# Patient Record
Sex: FEMALE | URBAN - METROPOLITAN AREA
[De-identification: names, ages, dates, MRNs, and addresses within clinical notes are randomized per-mention and may not be internally consistent; named-entity substitution may affect disease eponyms.]

---

## 2023-11-17 ENCOUNTER — APPOINTMENT (RX ONLY)
Dept: URBAN - METROPOLITAN AREA CLINIC 339 | Facility: CLINIC | Age: 68
Setting detail: DERMATOLOGY
End: 2023-11-17

## 2023-11-17 DIAGNOSIS — L98.8 OTHER SPECIFIED DISORDERS OF THE SKIN AND SUBCUTANEOUS TISSUE: ICD-10-CM

## 2023-11-17 PROCEDURE — ? COUNSELING

## 2023-11-17 PROCEDURE — ? BOTOX (U OR CC)

## 2023-11-17 NOTE — PROCEDURE: BOTOX (U OR CC)
Document As Units Or Cc?: units
Dilution (U/0.1 Cc): 0.5
Nasal Root Units/Cc: 0
Consent: Written consent obtained. Risks include but not limited to lid/brow ptosis, bruising, swelling, diplopia, temporary effect, incomplete chemical denervation.
Expiration Date (Month Year): 2025/11
Forehead Units/Cc: 10
Lot #: [id number]
Including Pricing Information In The Note: No
Glabellar Complex Units: 14
Price (Use Numbers Only, No Special Characters Or $): 408
Post-Care Instructions: Patient instructed to not lie down for 4 hours and limit physical activity for 24 hours. Patient instructed not to travel by airplane for 48 hours.
Detail Level: Detailed

## 2023-12-08 ENCOUNTER — APPOINTMENT (RX ONLY)
Dept: URBAN - METROPOLITAN AREA CLINIC 339 | Facility: CLINIC | Age: 68
Setting detail: DERMATOLOGY
End: 2023-12-08

## 2023-12-08 DIAGNOSIS — L98.8 OTHER SPECIFIED DISORDERS OF THE SKIN AND SUBCUTANEOUS TISSUE: ICD-10-CM

## 2023-12-08 PROCEDURE — ? COUNSELING

## 2023-12-08 PROCEDURE — ? BOTOX

## 2023-12-08 NOTE — PROCEDURE: BOTOX
Lateral Platysmal Bands Units: 0
Show Masseter Units: Yes
Detail Level: Detailed
Lot #: [id number]
Show Lcl Units: No
Dilution (U/0.1 Cc): 0.5
Periorbital Skin Units: 8
Expiration Date (Month Year): 11/2025
Price (Use Numbers Only, No Special Characters Or $): 96.00
Incrementing Botox Units: By 0.5 Units
Consent: Written consent obtained. Risks include but not limited to lid/brow ptosis, bruising, swelling, diplopia, temporary effect, incomplete chemical denervation.
Post-Care Instructions: Patient instructed to not lie down for 4 hours and limit physical activity for 24 hours. Patient instructed not to travel by airplane for 48 hours.

## 2024-03-20 ENCOUNTER — APPOINTMENT (RX ONLY)
Dept: URBAN - METROPOLITAN AREA CLINIC 356 | Facility: CLINIC | Age: 69
Setting detail: DERMATOLOGY
End: 2024-03-20

## 2024-03-20 DIAGNOSIS — Z41.9 ENCOUNTER FOR PROCEDURE FOR PURPOSES OTHER THAN REMEDYING HEALTH STATE, UNSPECIFIED: ICD-10-CM

## 2024-03-20 PROCEDURE — ? JUVEDERM VOLLURE XC INJECTION

## 2024-03-20 PROCEDURE — ? JUVEDERM VOLUMA XC INJECTION

## 2024-03-20 PROCEDURE — ? DYSPORT

## 2024-03-20 NOTE — PROCEDURE: JUVEDERM VOLUMA XC INJECTION
Marionette Lines Filler Volume In Cc: 0
Lot #: [id number]
Consent: Written consent obtained. Risks include but not limited to bruising, beading, irregular texture, ulceration, infection, allergic reaction, scar formation, incomplete augmentation, temporary nature, procedural pain.
Post-Care Instructions: Patient instructed to apply ice to reduce swelling.
Detail Level: Detailed
Include Cannula Information In Note?: No
Anesthesia Volume In Cc: 0.5
Cheeks Filler Volume In Cc: 2
Map Statment: See Attach Map for Complete Details
Expiration Date (Month Year): 09/26/24
Filler: Juvederm Voluma XC
Price (Use Numbers Only, No Special Characters Or $): 1600
Anesthesia Type: 1% lidocaine with epinephrine
Additional Anesthesia Volume In Cc: 6
Procedural Text: The filler was administered to the treatment areas noted above.

## 2024-03-20 NOTE — PROCEDURE: DYSPORT
Additional Comments: $14/ 3 units
Inferior Lateral Orbicularis Oculi Units: 60
Show Orbicularis Oculi Units: Yes
Additional Area 5 Units: 0
Consent: Written consent obtained. Risks include but not limited to lid/brow ptosis, bruising, swelling, diplopia, temporary effect, incomplete chemical denervation.
Post-Care Instructions: Patient instructed to not lie down for 4 hours and limit physical activity for 24 hours.
Show Right And Left Periorbital Units: No
Dilution (U/0.1 Cc): 10
Detail Level: Detailed
Forehead Units: 30
Price (Use Numbers Only, No Special Characters Or $): 289
Glabellar Complex Units: 45

## 2024-03-20 NOTE — HPI: COSMETIC (BOTOX)
[id number]: previous_has_had_botox
When Was Your Last Botox Treatment?: 12/1/23
Additional History: Consult on Botox and possible filler.

## 2024-03-20 NOTE — PROCEDURE: JUVEDERM VOLLURE XC INJECTION
Jawline Filler Volume In Cc: 0
Expiration Date (Month Year): 11/16/24
Including Pricing Information In The Note: No
Price (Use Numbers Only, No Special Characters Or $): 316
Filler: Juvederm Vollure XC
Nasolabial Folds Filler Volume In Cc: 1
Consent: Written consent obtained. Risks include but not limited to bruising, beading, irregular texture, ulceration, infection, allergic reaction, scar formation, incomplete augmentation, temporary nature, procedural pain.
Anesthesia Type: 1% lidocaine with epinephrine
Additional Anesthesia Volume In Cc: 6
Procedural Text: The filler was administered to the treatment areas noted above.
Lot #: [id number]
Post-Care Instructions: Patient instructed to apply ice to reduce swelling.
Detail Level: Detailed
Anesthesia Volume In Cc: 0.5
Map Statment: See Attach Map for Complete Details

## 2024-04-02 ENCOUNTER — APPOINTMENT (RX ONLY)
Dept: URBAN - METROPOLITAN AREA CLINIC 356 | Facility: CLINIC | Age: 69
Setting detail: DERMATOLOGY
End: 2024-04-02

## 2024-04-02 DIAGNOSIS — Z41.9 ENCOUNTER FOR PROCEDURE FOR PURPOSES OTHER THAN REMEDYING HEALTH STATE, UNSPECIFIED: ICD-10-CM

## 2024-04-02 PROCEDURE — ? COSMETIC FOLLOW-UP

## 2024-04-02 NOTE — HPI: COSMETIC FOLLOW UP
How Did You Tolerate The Procedure?: well, without problems
What Condition Are We Treating?: Botox and filler
What Procedure Did We Perform At The Last Visit?: Filler and Botox

## 2024-04-02 NOTE — PROCEDURE: COSMETIC FOLLOW-UP
Price (Use Numbers Only, No Special Characters Or $): 0
Comments (Free Text): Patient very satisfied with filler and Botox results.  She may consider doing some rha redensity around the lips, and some Kysse in the lips next visit.  I also recommend Sculptra for her.
Detail Level: Zone

## 2024-08-20 ENCOUNTER — APPOINTMENT (RX ONLY)
Dept: URBAN - METROPOLITAN AREA CLINIC 356 | Facility: CLINIC | Age: 69
Setting detail: DERMATOLOGY
End: 2024-08-20

## 2024-08-20 DIAGNOSIS — Z41.9 ENCOUNTER FOR PROCEDURE FOR PURPOSES OTHER THAN REMEDYING HEALTH STATE, UNSPECIFIED: ICD-10-CM

## 2024-08-20 PROCEDURE — ? DYSPORT

## 2024-08-20 NOTE — PROCEDURE: DYSPORT
Inferior Lateral Orbicularis Oculi Units: 60
Show Orbicularis Oculi Units: Yes
Additional Area 5 Units: 0
Consent: Written consent obtained. Risks include but not limited to lid/brow ptosis, bruising, swelling, diplopia, temporary effect, incomplete chemical denervation.
Post-Care Instructions: Patient instructed to not lie down for 4 hours and limit physical activity for 24 hours.
Show Right And Left Periorbital Units: No
Lot #: [id number]
Dilution (U/0.1 Cc): 10
Detail Level: Detailed
Expiration Date (Month Year): 01/2026
Forehead Units: 30
Price (Use Numbers Only, No Special Characters Or $): 056
Glabellar Complex Units: 45

## 2025-02-12 ENCOUNTER — APPOINTMENT (OUTPATIENT)
Dept: URBAN - METROPOLITAN AREA CLINIC 356 | Facility: CLINIC | Age: 70
Setting detail: DERMATOLOGY
End: 2025-02-12

## 2025-02-12 DIAGNOSIS — Z41.9 ENCOUNTER FOR PROCEDURE FOR PURPOSES OTHER THAN REMEDYING HEALTH STATE, UNSPECIFIED: ICD-10-CM

## 2025-02-12 PROCEDURE — ? DYSPORT

## 2025-02-12 NOTE — HPI: COSMETIC (DYSPORT)
[id number]: previous_has_had_dysport
When Was Your Last Dysport Treatment?: 08/24
Additional History: Dysport
Statement Selected

## 2025-02-12 NOTE — PROCEDURE: DYSPORT
Show Right And Left Brow Units: No
Mentalis Units: 0
Show Anterior Platysmal Band Units: Yes
Price (Use Numbers Only, No Special Characters Or $): 356
Expiration Date (Month Year): 5/31/26
Additional Area 1 Location: jelly rolls
Forehead Units: 30
Consent: Written consent obtained. Risks include but not limited to lid/brow ptosis, bruising, swelling, diplopia, temporary effect, incomplete chemical denervation.
Post-Care Instructions: Patient instructed to not lie down for 4 hours and limit physical activity for 24 hours.
Inferior Lateral Orbicularis Oculi Units: 60
Additional Area 1 Units: 6
Glabellar Complex Units: 45
Lot #: [id number]
Detail Level: Detailed
Dilution (U/0.1 Cc): 10

## 2025-02-19 ENCOUNTER — APPOINTMENT (OUTPATIENT)
Dept: URBAN - METROPOLITAN AREA CLINIC 356 | Facility: CLINIC | Age: 70
Setting detail: DERMATOLOGY
End: 2025-02-19

## 2025-02-19 DIAGNOSIS — Z41.9 ENCOUNTER FOR PROCEDURE FOR PURPOSES OTHER THAN REMEDYING HEALTH STATE, UNSPECIFIED: ICD-10-CM

## 2025-02-19 PROCEDURE — ? JUVEDERM VOLUMA XC INJECTION

## 2025-02-19 PROCEDURE — ? COSMETIC FOLLOW-UP

## 2025-02-19 PROCEDURE — ? JUVEDERM VOLLURE XC INJECTION

## 2025-02-19 NOTE — PROCEDURE: COSMETIC FOLLOW-UP
Detail Level: Zone
Comments (Free Text): Touch upon 4 sample units Botox in the forehead.
Price (Use Numbers Only, No Special Characters Or $): 0

## 2025-02-19 NOTE — PROCEDURE: JUVEDERM VOLLURE XC INJECTION
Procedural Text: The filler was administered to the treatment areas noted above.
Number Of Syringes (Required For Inventory): 1
Marionette Lines Filler Volume In Cc: 0
Consent: Written consent obtained. Risks include but not limited to bruising, beading, irregular texture, ulceration, infection, allergic reaction, scar formation, incomplete augmentation, temporary nature, procedural pain.
Additional Anesthesia Volume In Cc: 6
Use Map Statement For Sites (Optional): No
Detail Level: Detailed
Lot #: [id number]
Anesthesia Volume In Cc: 0.5
Post-Care Instructions: Patient instructed to apply ice to reduce swelling.
Expiration Date (Month Year): 2/28/25
Filler: Juvederm Vollure XC
Map Statment: See Attach Map for Complete Details
Anesthesia Type: 1% lidocaine with epinephrine
Price (Use Numbers Only, No Special Characters Or $): 085

## 2025-02-19 NOTE — PROCEDURE: JUVEDERM VOLUMA XC INJECTION
Include Cannula Information In Note?: No
Decollete Filler Volume In Cc: 0
Number Of Syringes (Required For Inventory): 2
Additional Anesthesia Volume In Cc: 6
Consent: Written consent obtained. Risks include but not limited to bruising, beading, irregular texture, ulceration, infection, allergic reaction, scar formation, incomplete augmentation, temporary nature, procedural pain.
Expiration Date (Month Year): 4/16/25
Anesthesia Volume In Cc: 0.5
Detail Level: Detailed
Post-Care Instructions: Patient instructed to apply ice to reduce swelling.
Filler: Juvederm Voluma XC
Map Statment: See Attach Map for Complete Details
Lot #: [id number]
Anesthesia Type: 1% lidocaine with epinephrine
Procedural Text: The filler was administered to the treatment areas noted above.
Price (Use Numbers Only, No Special Characters Or $): 1800

## 2025-03-05 ENCOUNTER — APPOINTMENT (OUTPATIENT)
Dept: URBAN - METROPOLITAN AREA CLINIC 356 | Facility: CLINIC | Age: 70
Setting detail: DERMATOLOGY
End: 2025-03-05

## 2025-03-05 DIAGNOSIS — Z41.9 ENCOUNTER FOR PROCEDURE FOR PURPOSES OTHER THAN REMEDYING HEALTH STATE, UNSPECIFIED: ICD-10-CM

## 2025-03-05 PROCEDURE — ? COSMETIC FOLLOW-UP

## 2025-03-05 NOTE — HPI: COSMETIC FOLLOW UP
How Did You Tolerate The Procedure?: well, without problems
What Condition Are We Treating?: Wrinkles
What Procedure Did We Perform At The Last Visit?: Filler

## 2025-03-05 NOTE — PROCEDURE: COSMETIC FOLLOW-UP
Detail Level: Zone
Price (Use Numbers Only, No Special Characters Or $): 0
Comments (Free Text): Patient very pleased with filler results.  Added touch up with samples of Botox on forehead today (4 units).